# Patient Record
Sex: FEMALE | Race: WHITE | Employment: UNEMPLOYED | URBAN - METROPOLITAN AREA
[De-identification: names, ages, dates, MRNs, and addresses within clinical notes are randomized per-mention and may not be internally consistent; named-entity substitution may affect disease eponyms.]

---

## 2019-09-24 ENCOUNTER — HOSPITAL ENCOUNTER (OUTPATIENT)
Dept: ULTRASOUND IMAGING | Facility: CLINIC | Age: 64
End: 2019-09-24
Attending: OBSTETRICS & GYNECOLOGY
Payer: COMMERCIAL

## 2019-09-24 ENCOUNTER — HOSPITAL ENCOUNTER (OUTPATIENT)
Dept: MAMMOGRAPHY | Facility: CLINIC | Age: 64
Discharge: HOME | End: 2019-09-24
Attending: OBSTETRICS & GYNECOLOGY | Admitting: OBSTETRICS & GYNECOLOGY
Payer: COMMERCIAL

## 2019-09-24 DIAGNOSIS — N63.20 LEFT BREAST LUMP: ICD-10-CM

## 2019-09-24 PROCEDURE — 76642 ULTRASOUND BREAST LIMITED: CPT | Mod: LT

## 2019-09-24 PROCEDURE — 77066 DX MAMMO INCL CAD BI: CPT

## 2019-09-24 PROCEDURE — G0279 TOMOSYNTHESIS, MAMMO: HCPCS

## 2020-06-26 ENCOUNTER — TRANSFERRED RECORDS (OUTPATIENT)
Dept: HEALTH INFORMATION MANAGEMENT | Facility: CLINIC | Age: 65
End: 2020-06-26

## 2020-08-24 ENCOUNTER — MEDICAL CORRESPONDENCE (OUTPATIENT)
Dept: HEALTH INFORMATION MANAGEMENT | Facility: CLINIC | Age: 65
End: 2020-08-24

## 2020-08-24 ENCOUNTER — TELEPHONE (OUTPATIENT)
Dept: ORTHOPEDICS | Facility: CLINIC | Age: 65
End: 2020-08-24

## 2020-08-24 NOTE — TELEPHONE ENCOUNTER
Health Call Center    Phone Message    May a detailed message be left on voicemail: yes     Reason for Call: Other: Pt called to schedule with Dr. Diallo for SI Joint, Per Pt, can't recall if it is right or left SI Joint, Pt referred by Dr. Nguyen at [facility], Pt is travelling by  and will be going back to [place] Oct. 1, 2020. The first available appointment with Dr. Diallo is Oct. 14th or 15th.  Pt is in a lot of pain with SI Joint and would like to know if she could get an injection or be seen sooner by Dr. Diallo. MRI performed by [facility] in [place], [place], Pt says she has the images.  Please call Pt. back at [phone].     Action Taken: Message routed to:  [facility] Ortho     Travel Screening: Not Applicable

## 2020-08-26 ENCOUNTER — TRANSCRIBE ORDERS (OUTPATIENT)
Dept: OTHER | Age: 65
End: 2020-08-26

## 2020-08-26 DIAGNOSIS — M54.9 BACK PAIN: Primary | ICD-10-CM

## 2020-08-26 DIAGNOSIS — M25.519 PAIN IN JOINT, SHOULDER REGION: ICD-10-CM

## 2020-08-26 NOTE — TELEPHONE ENCOUNTER
[facility] Call [facility]    Phone Message    May a detailed message be left on voicemail: yes     Reason for Call: Other: Other: patient called back stating that the referring clinic faxed over the order to the clinic. please call patient when you receive the referral from the referring clinic. she'd like to get scheduled.       Action Taken: Other: ortho    Travel Screening: Not Applicable

## 2020-08-26 NOTE — TELEPHONE ENCOUNTER
Writer called and left pt a phone message stating that can schedule pt with provider on 9/24/2020 at 3:15 pm. Pt needs to make sure all records and imaging regarding the back sent to the clinic prior to appointment. Pt needs to call back to [phone] to confirm if that appointment will work.     Deena Gore LPN

## 2020-08-26 NOTE — TELEPHONE ENCOUNTER
Writer called and patient a message stating that could possibly schedule pt at the end of September with Dr. Diallo. At this time the referral and records have not been received pt would need to have all of that sent to the clinic to get scheduled.   Pt is to call back [phone].    Deena Gore LPN

## 2020-09-18 ENCOUNTER — TELEPHONE (OUTPATIENT)
Dept: ORTHOPEDICS | Facility: CLINIC | Age: 65
End: 2020-09-18

## 2020-09-18 NOTE — TELEPHONE ENCOUNTER
Writer called and left patient a voice message. Writer called patient to get more information on what location patient is having the pain to make sure that patient is paired with the right provider. Writer also requested that PT call [facility] and have them send all records and imaging so the provider can review for the appointment. Pt is to call the clinic with any questions [phone].     Deena Gore LPN

## 2020-09-21 ENCOUNTER — PRE VISIT (OUTPATIENT)
Dept: ORTHOPEDICS | Facility: CLINIC | Age: 65
End: 2020-09-21

## 2020-09-21 NOTE — TELEPHONE ENCOUNTER
INTAKE QUESTIONS FOR SPINE AND NECK                                                                     REASON FOR VISIT: SI Joint     APPOINTMENT DATE: 09/24/2020   HAVE YOU HAD PREVIOUS SURGERIES ON YOUR NECK OR SPINE: No  WHERE? NA    WHEN? NA      HAVE YOU HAD RELATED IMAGING?   (BONE SCANS, XRAYS, CAT SCANS, MRIS) Yes    WHERE? [facility]    WHEN? 06/2020   HAVE YOU HAD INJECTIONS TO THE SPINE? Yes    WHERE? [facility]    WHEN? 07/2020   HAVE YOU HAD RELATED PHYSICAL THERAPY IN THE LAST YEAR? Yes    WHERE? [facility]   DO YOU HAVE ANY RELATED IMPLANTS OR HARDWARE? No   DO YOU HAVE ANY PATHOLOGY REPORTS RELATED TO YOUR SPINE OR NECK? No     CSS NOTES STATUS DETAILS   OFFICE NOTE from referring provider In process    OFFICE NOTE from other specialist In process    PHYSICAL THERAPY (WITHIN LAST YEAR) In process    DISCHARGE SUMMARY from hospital In process    DISCHARGE REPORT from the ER In process    OPERATIVE REPORT In process    MEDICATION LIST In process    LABS/CBC/DIFF In process    CULTURES In process    IMPLANT RECORD/STICKER In process    IMAGING     INJECTIONS DONE IN RADIOLOGY In process    MRI In process    CT SCAN In process    XRAYS (IMAGES & REPORTS) In process    TUMOR     PATHOLOGY  Slides & report In process

## 2020-09-22 ENCOUNTER — TELEPHONE (OUTPATIENT)
Dept: ORTHOPEDICS | Facility: CLINIC | Age: 65
End: 2020-09-22

## 2020-09-22 NOTE — TELEPHONE ENCOUNTER
[facility] Call [facility]    Phone Message    May a detailed message be left on voicemail: yes     Reason for Call: Other: Patient is calling to see if her MRI and paper work from [facility] was received before her appointment on Thursday with . Per patient they need to be in, in order to see him.      Action Taken: Other: ORTHO    Travel Screening: Not Applicable

## 2020-09-22 NOTE — TELEPHONE ENCOUNTER
Called abck. Advised pt to get as much as they can and bring it in hand. Talked with records and called pt back that we requested the records again.       Alejandra

## 2020-09-24 ENCOUNTER — ANCILLARY PROCEDURE (OUTPATIENT)
Dept: GENERAL RADIOLOGY | Facility: CLINIC | Age: 65
End: 2020-09-24
Attending: ORTHOPAEDIC SURGERY
Payer: COMMERCIAL

## 2020-09-24 ENCOUNTER — OFFICE VISIT (OUTPATIENT)
Dept: ORTHOPEDICS | Facility: CLINIC | Age: 65
End: 2020-09-24
Payer: COMMERCIAL

## 2020-09-24 VITALS — WEIGHT: 158.3 LBS | BODY MASS INDEX: 23.99 KG/M2 | HEIGHT: 68 IN

## 2020-09-24 DIAGNOSIS — M25.519 ARTHRALGIA OF SHOULDER, UNSPECIFIED LATERALITY: ICD-10-CM

## 2020-09-24 DIAGNOSIS — M46.1 SACROILIITIS (H): ICD-10-CM

## 2020-09-24 DIAGNOSIS — M46.1 SACROILIITIS (H): Primary | ICD-10-CM

## 2020-09-24 RX ORDER — HYDROCODONE BITARTRATE AND ACETAMINOPHEN 5; 325 MG/1; MG/1
1 TABLET ORAL PRN
COMMUNITY
Start: 2020-09-15

## 2020-09-24 RX ORDER — BACLOFEN 10 MG/1
1 TABLET ORAL PRN
COMMUNITY
Start: 2020-07-24

## 2020-09-24 RX ORDER — DIAZEPAM 5 MG
10 TABLET ORAL EVERY 6 HOURS PRN
Qty: 1 TABLET | Refills: 0 | Status: SHIPPED | OUTPATIENT
Start: 2020-09-24

## 2020-09-24 ASSESSMENT — MIFFLIN-ST. JEOR: SCORE: 1321.41

## 2020-09-24 NOTE — NURSING NOTE
"Reason For Visit:   Chief Complaint   Patient presents with     Consult     SI Joint / Houston Nguyen @ [facility]        Primary MD: Dr. Brown, [facility]  Ref. MD: Dr. Houston Nguyen [facility]    ?  No  Occupation retired    Date of injury: No  Type of injury: No.    Date of surgery: No  Type of surgery: no.    Smoker: No  Request smoking cessation information: No    Ht 1.735 m (5' 8.31\")   Wt 71.8 kg (158 lb 4.8 oz)   BMI 23.85 kg/m      Pain Assessment  Patient Currently in Pain: Yes  0-10 Pain Scale: 5  Primary Pain Location: Back    Oswestry (FARA) Questionnaire    OSWESTRY DISABILITY INDEX 9/24/2020   Count 9   Sum 26   Oswestry Score (%) 57.78   Some recent data might be hidden          Visual Analog Pain Scale  Back Pain Scale 0-10: 5  Right leg pain: 0  Left leg pain: 0  Neck Pain Scale 0-10: 0  Right arm pain: 0  Left arm pain: 0    Promis 10 Assessment    PROMIS 10 9/24/2020   In general, would you say your health is: Good   In general, would you say your quality of life is: Fair   In general, how would you rate your physical health? Good   In general, how would you rate your mental health, including your mood and your ability to think? Fair   In general, how would you rate your satisfaction with your social activities and relationships? Excellent   In general, please rate how well you carry out your usual social activities and roles Fair   To what extent are you able to carry out your everyday physical activities such as walking, climbing stairs, carrying groceries, or moving a chair? A little   How often have you been bothered by emotional problems such as feeling anxious, depressed or irritable? Sometimes   How would you rate your fatigue on average? Moderate   How would you rate your pain on average?   0 = No Pain  to  10 = Worst Imaginable Pain 9   In general, would you say your health is: 3   In general, would you say your quality of life is: 2   In general, how would you rate your " physical health? 3   In general, how would you rate your mental health, including your mood and your ability to think? 2   In general, how would you rate your satisfaction with your social activities and relationships? 5   In general, please rate how well you carry out your usual social activities and roles. (This includes activities at home, at work and in your community, and responsibilities as a parent, child, spouse, employee, friend, etc.) 2   To what extent are you able to carry out your everyday physical activities such as walking, climbing stairs, carrying groceries, or moving a chair? 2   In the past 7 days, how often have you been bothered by emotional problems such as feeling anxious, depressed, or irritable? 3   In the past 7 days, how would you rate your fatigue on average? 3   In the past 7 days, how would you rate your pain on average, where 0 means no pain, and 10 means worst imaginable pain? 9   Global Mental Health Score 12   Global Physical Health Score 10   PROMIS TOTAL - SUBSCORES 22   Some recent data might be hidden                Deena Gore LPN

## 2020-09-24 NOTE — LETTER
"    9/24/2020         RE: Dariela Peres  [address] [place] [address]        Dear Colleague,    Thank you for referring your patient, Dariela Peres, to the [facility]. Please see a copy of my visit note below.    Service Date: 09/24/2020      HISTORY OF PRESENT ILLNESS:  Ms. Peres is a 65-year-old female presenting [facility] for evaluation of right sacroiliitis.  The patient first noted onset of symptoms approximately 1 year ago.  This was not preceded by a traumatic event and gradually worsened.  For the past 9 months or so, the patient has not been able to sit.  She primarily will stand with an SI band.  Her standing tolerance is less than 1 hour.  She is able to lie in a supine position, does not tolerate lying in the lateral position.  There is not a radicular component to her symptoms.  This pain is worse in the a.m., and she has trouble bearing weight in her right lower extremity.  No history of generalized pain disorder or inflammatory conditions.  She was initially evaluated in [place] for her sacroiliitis and underwent an SI joint injection under fluoroscopy.  She reports 75% reduction in her pain with approximately 1 month duration.      Of note, the patient has a diagnosis of melanoma that was present on the plantar surface of her foot.  This was surgically excised, and she had a systemic workup that did not demonstrate a metastasis.  She has been free from disease for the past 2 years.     PHYSICAL EXAMINATION:   Ht 1.735 m (5' 8.31\")   Wt 71.8 kg (158 lb 4.8 oz)   BMI 23.85 kg/m     FARA 58%.  VAS 5.   The patient is alert and appropriately communicative.  Standing in the exam room upon entering.  Wearing an SI compression belt.  Positive point sign to the right SI joint.  Pain with pressure over the right SI.  No radicular pain.  Able to reproduce right SI joint pain with thigh thrust, pelvic gapping, JORGE and Gaenslen maneuvers.  " Negative for pelvic compression.  Negative log roll pain bilaterally.  No pain with palpation of the greater trochanter, piriformis, or ischial tuberosities bilaterally.      IMAGING:  MRI dated 07/02/2020 was reviewed.  This does not demonstrate any spinal canal or bony lesion that could be a source of referred pain to explain her symptoms.  Pelvic and hip MRIs dated 02/07/2020 were also reviewed.  Again, these imaging studies were negative for mass lesion or alternative diagnoses to explain the patient's presenting complaints.  Lumbar spine and pelvic x-rays were obtained in clinic today, which did not demonstrate any bony lesions. Castellvi transitional vertebrae type IA vs IB.  Mild scoliosis.          ASSESSMENT:  Right sacroiliitis.  She is positive on exam (4/5 tests positive) with a good response to an SI joint injection. The transitional vertebrae is too subtle to justify Bertolotti's syndrome. The mild scoliosis is also highly unlikely to be a pain generator.     PLAN:   1.  The patient will be referred for a CT-guided right SI joint injection for therapeutic and diagnostic purposes.   2.  The patient and her  have plans to leave the state within the near future.  If she would like to pursue surgical intervention and she receives benefit from the recommended injection, she is welcome call and have surgery scheduled.  I discussed surgery with the patient today. She was shown a physical model and an actual implant.     The risks include, but are not limited to, death, myocardial infarction, pulmonary embolism, deep venous thrombosis, pneumonia, bleeding, infection, nerve damage, muscle damage, stiffness, instability, continued or worsening pain, and need for future surgery. Typical results are 80% of patients have a 50% improvement in pain.            RUDY DIALLO JR, MD       As dictated by DAV MARROQUIN MD     I saw and evaluated the patient and developed the plan.  Rudy Diallo,  MD             D: 2020   T: 2020   MT: rashida      Name:     KP ALMARAZ   MRN:      [id number]        Account:      [id number]   :      1955           Service Date: 2020      Document: [id number]

## 2020-09-24 NOTE — PROGRESS NOTES
"Service Date: 09/24/2020      HISTORY OF PRESENT ILLNESS:  Ms. Peres is a 65-year-old female presenting [facility] for evaluation of right sacroiliitis.  The patient first noted onset of symptoms approximately 1 year ago.  This was not preceded by a traumatic event and gradually worsened.  For the past 9 months or so, the patient has not been able to sit.  She primarily will stand with an SI band.  Her standing tolerance is less than 1 hour.  She is able to lie in a supine position, does not tolerate lying in the lateral position.  There is not a radicular component to her symptoms.  This pain is worse in the a.m., and she has trouble bearing weight in her right lower extremity.  No history of generalized pain disorder or inflammatory conditions.  She was initially evaluated in [place] for her sacroiliitis and underwent an SI joint injection under fluoroscopy.  She reports 75% reduction in her pain with approximately 1 month duration.      Of note, the patient has a diagnosis of melanoma that was present on the plantar surface of her foot.  This was surgically excised, and she had a systemic workup that did not demonstrate a metastasis.  She has been free from disease for the past 2 years.     PHYSICAL EXAMINATION:   Ht 1.735 m (5' 8.31\")   Wt 71.8 kg (158 lb 4.8 oz)   BMI 23.85 kg/m     FARA 58%.  VAS 5.   The patient is alert and appropriately communicative.  Standing in the exam room upon entering.  Wearing an SI compression belt.  Positive point sign to the right SI joint.  Pain with pressure over the right SI.  No radicular pain.  Able to reproduce right SI joint pain with thigh thrust, pelvic gapping, JORGE and Gaenslen maneuvers.  Negative for pelvic compression.  Negative log roll pain bilaterally.  No pain with palpation of the greater trochanter, piriformis, or ischial tuberosities bilaterally.      IMAGING:  MRI dated 07/02/2020 was reviewed.  This does not demonstrate any spinal " canal or bony lesion that could be a source of referred pain to explain her symptoms.  Pelvic and hip MRIs dated 2020 were also reviewed.  Again, these imaging studies were negative for mass lesion or alternative diagnoses to explain the patient's presenting complaints.  Lumbar spine and pelvic x-rays were obtained in clinic today, which did not demonstrate any bony lesions. Castellvi transitional vertebrae type IA vs IB.  Mild scoliosis.          ASSESSMENT:  Right sacroiliitis.  She is positive on exam (4/5 tests positive) with a good response to an SI joint injection. The transitional vertebrae is too subtle to justify Bertolotti's syndrome. The mild scoliosis is also highly unlikely to be a pain generator.     PLAN:   1.  The patient will be referred for a CT-guided right SI joint injection for therapeutic and diagnostic purposes.   2.  The patient and her  have plans to leave the state within the near future.  If she would like to pursue surgical intervention and she receives benefit from the recommended injection, she is welcome call and have surgery scheduled.  I discussed surgery with the patient today. She was shown a physical model and an actual implant.     The risks include, but are not limited to, death, myocardial infarction, pulmonary embolism, deep venous thrombosis, pneumonia, bleeding, infection, nerve damage, muscle damage, stiffness, instability, continued or worsening pain, and need for future surgery. Typical results are 80% of patients have a 50% improvement in pain.            EDER DIALLO JR, MD       As dictated by DAV MARROQUIN MD     I saw and evaluated the patient and developed the plan.  Eder Diallo MD             D: 2020   T: 2020   MT: rashida      Name:     KP ALMARAZ   MRN:      [id number]        Account:      [id number]   :      1955           Service Date: 2020      Document: [id number]

## 2020-09-25 ENCOUNTER — PREP FOR PROCEDURE (OUTPATIENT)
Dept: ORTHOPEDICS | Facility: CLINIC | Age: 65
End: 2020-09-25

## 2020-09-25 ENCOUNTER — TELEPHONE (OUTPATIENT)
Dept: ORTHOPEDICS | Facility: CLINIC | Age: 65
End: 2020-09-25

## 2020-09-25 ENCOUNTER — TRANSFERRED RECORDS (OUTPATIENT)
Dept: HEALTH INFORMATION MANAGEMENT | Facility: CLINIC | Age: 65
End: 2020-09-25

## 2020-09-25 DIAGNOSIS — M46.1 SACROILIITIS (H): Primary | ICD-10-CM

## 2020-09-25 NOTE — TELEPHONE ENCOUNTER
[facility]    Phone Message    May a detailed message be left on voicemail: yes     Reason for Call: Other: Pt of Dr. Diallo's calling in stating she went to schedule the injection with EVANGELINA Ponce and the order doesnt say what part of the body to inject.  Please send EVANGELINA Ponce a new order with that on it and call the pt when this has been done so she can get it scheduled     Action Taken: Message routed to:  [facility] Ortho    Travel Screening: Not Applicable

## 2020-09-25 NOTE — TELEPHONE ENCOUNTER
See previous telephone message writer confirmed from Dr. Diallo's visit note that states Right SI joint injection.     Deena Gore LPN

## 2020-09-25 NOTE — TELEPHONE ENCOUNTER
Writer talked with [facility] and confirmed laterality of the injection order that was place in the clinic apt with Dr. Diallo on 9/24/2020.     Deena Gore LPN